# Patient Record
Sex: FEMALE | Race: WHITE | URBAN - METROPOLITAN AREA
[De-identification: names, ages, dates, MRNs, and addresses within clinical notes are randomized per-mention and may not be internally consistent; named-entity substitution may affect disease eponyms.]

---

## 2019-10-07 ENCOUNTER — APPOINTMENT (RX ONLY)
Dept: URBAN - METROPOLITAN AREA CLINIC 53 | Facility: CLINIC | Age: 24
Setting detail: DERMATOLOGY
End: 2019-10-07

## 2019-10-07 DIAGNOSIS — L81.4 OTHER MELANIN HYPERPIGMENTATION: ICD-10-CM

## 2019-10-07 DIAGNOSIS — L81.1 CHLOASMA: ICD-10-CM

## 2019-10-07 PROCEDURE — ? IN-HOUSE DISPENSING PHARMACY

## 2019-10-07 PROCEDURE — ? COUNSELING

## 2019-10-07 PROCEDURE — ? RECOMMENDATIONS

## 2019-10-07 PROCEDURE — ? PRESCRIPTION

## 2019-10-07 PROCEDURE — ? MEDICAL CONSULTATION: CHEMICAL PEELS

## 2019-10-07 PROCEDURE — 99202 OFFICE O/P NEW SF 15 MIN: CPT

## 2019-10-07 RX ORDER — HYDROQUINONE 4 %
CREAM (GRAM) TOPICAL
Qty: 1 | Refills: 0 | COMMUNITY
Start: 2019-10-07

## 2019-10-07 RX ADMIN — Medication: at 13:54

## 2019-10-07 ASSESSMENT — LOCATION SIMPLE DESCRIPTION DERM
LOCATION SIMPLE: RIGHT FOREHEAD
LOCATION SIMPLE: LEFT LIP
LOCATION SIMPLE: RIGHT CHEEK
LOCATION SIMPLE: RIGHT CHEEK
LOCATION SIMPLE: FRONTAL SCALP
LOCATION SIMPLE: LEFT FOREHEAD
LOCATION SIMPLE: LEFT CHEEK
LOCATION SIMPLE: LEFT CHEEK

## 2019-10-07 ASSESSMENT — LOCATION DETAILED DESCRIPTION DERM
LOCATION DETAILED: RIGHT INFERIOR CENTRAL MALAR CHEEK
LOCATION DETAILED: RIGHT INFERIOR LATERAL FOREHEAD
LOCATION DETAILED: RIGHT INFERIOR CENTRAL MALAR CHEEK
LOCATION DETAILED: LEFT INFERIOR CENTRAL MALAR CHEEK
LOCATION DETAILED: LEFT LOWER CUTANEOUS LIP
LOCATION DETAILED: RIGHT CENTRAL BUCCAL CHEEK
LOCATION DETAILED: LEFT INFERIOR CENTRAL MALAR CHEEK
LOCATION DETAILED: MEDIAL FRONTAL SCALP
LOCATION DETAILED: LEFT INFERIOR MEDIAL FOREHEAD
LOCATION DETAILED: RIGHT MEDIAL FOREHEAD
LOCATION DETAILED: LEFT MEDIAL MALAR CHEEK
LOCATION DETAILED: RIGHT SUPERIOR LATERAL FOREHEAD

## 2019-10-07 ASSESSMENT — LOCATION ZONE DERM
LOCATION ZONE: LIP
LOCATION ZONE: FACE
LOCATION ZONE: FACE
LOCATION ZONE: SCALP

## 2019-10-07 NOTE — PROCEDURE: IN-HOUSE DISPENSING PHARMACY
Product 15 Price/Unit (In Dollars): 0
Render Product Pricing In Note: Yes
Product 12 Unit Type: mg
Product 3 Price/Unit (In Dollars): 175
Name Of Product 1: ZO  hydroquinone 4%
Product 5 Unit Type: grams
Product 1 Units Dispensed: 1
Product 1 Application Directions: Apply qhs
Name Of Product 4: Elta Spray SPF
Product 2 Price/Unit (In Dollars): 45
Detail Level: Zone
Product 2 Refills: 2
Name Of Product 5: Elta MD UV Elements
Name Of Product 3: Latisse
Product 5 Amount/Unit (Numbers Only): 57
Product 1 Price/Unit (In Dollars): 66
Product 3 Application Directions: Apply to upper lash line qhs
Product 3 Unit Type: ml
Product 1 Unit Type: tube(s)
Product 4 Price/Unit (In Dollars): 31.50
Name Of Product 2: Tretinoin 0.1% Cream
Product 4 Amount/Unit (Numbers Only): 170
Product 5 Price/Unit (In Dollars): 33

## 2019-10-07 NOTE — PROCEDURE: RECOMMENDATIONS
Recommendations (Free Text): Spf daily (zinc),see our  regarding chemical peels, hydroquinone  4% daily, retin a qhs
Detail Level: Zone